# Patient Record
Sex: MALE | Race: WHITE | Employment: FULL TIME | URBAN - METROPOLITAN AREA
[De-identification: names, ages, dates, MRNs, and addresses within clinical notes are randomized per-mention and may not be internally consistent; named-entity substitution may affect disease eponyms.]

---

## 2019-02-25 ENCOUNTER — OFFICE VISIT (OUTPATIENT)
Dept: FAMILY MEDICINE | Facility: CLINIC | Age: 34
End: 2019-02-25
Payer: COMMERCIAL

## 2019-02-25 VITALS
HEIGHT: 70 IN | SYSTOLIC BLOOD PRESSURE: 128 MMHG | HEART RATE: 80 BPM | DIASTOLIC BLOOD PRESSURE: 88 MMHG | RESPIRATION RATE: 12 BRPM | WEIGHT: 256 LBS | TEMPERATURE: 98.5 F | OXYGEN SATURATION: 97 % | BODY MASS INDEX: 36.65 KG/M2

## 2019-02-25 DIAGNOSIS — Z30.09 ENCOUNTER FOR VASECTOMY COUNSELING: Primary | ICD-10-CM

## 2019-02-25 PROCEDURE — 99202 OFFICE O/P NEW SF 15 MIN: CPT | Performed by: FAMILY MEDICINE

## 2019-02-25 ASSESSMENT — MIFFLIN-ST. JEOR: SCORE: 2112.46

## 2019-02-25 ASSESSMENT — PAIN SCALES - GENERAL: PAINLEVEL: NO PAIN (0)

## 2019-02-25 NOTE — PROGRESS NOTES
"  SUBJECTIVE:   Barrington Mckinney is a 33 year old male who presents to clinic today for the following health issues:      Vasectomy consult       Barrington is here today for vasectomy consult. He is  with 2 children (boys at 6 and 2 yo).  Stated that both he and his wife are absolutely for sure that they do not want anymore children in the future.  Stated that they are very happy with what they have.  Currently they use BC pill and condom and that is going ok.   He is generally healthy. No history of scrotal or groin injury.  No history of anesthesia complication.  No other concern today.       Problem list and histories reviewed & adjusted, as indicated.  Additional history: as documented    History reviewed. No pertinent past medical history.    History reviewed. No pertinent surgical history.     No current outpatient medications on file.         No Known Allergies      ROS:  Constitutional, HEENT, cardiovascular, pulmonary, gi and gu systems are negative, except as otherwise noted.    OBJECTIVE:                                                    /66 mmHg  Pulse 68  Temp(Src) 97.6  F (36.4  C) (Temporal)  Resp 14  Ht 5' 10\" (1.778 m)  Wt 176 lb 9.6 oz (80.105 kg)  BMI 25.34 kg/m2  SpO2 99%  Body mass index is 25.34 kg/(m^2).  GENERAL: healthy, alert and no distress  RESP: lungs clear to auscultation - no rales, rhonchi or wheezes  CV: regular rate and rhythm, no murmur.   (male): normal male genitalia without lesions or urethral discharge, no hernia.  The vas deferens were easily palpated.    Diagnostic Test Results:  none      ASSESSMENT/PLAN:                                                    1. Vasectomy evaluation  I discussed with Barrington in details about different birth control options and pros and cons of each options was educated.  Reassured patient that Vasectomy is considered a permanent and irreversible birth control method therefore he should not consider it if either he or his " significant other has thought about have more children together in the future.  Patient stated that both he and his wife are very sure that they do not want to have anymore children now or in the future.  I discussed with paient about the vasectomy procedure itself as well as its pre and post procedure cares.  I also discussed about how the procedure works and risks associated with the procudure.  Reassured him that even though vasectomy is very effective, there is a very minute chance that he can still pregnant his wife.  Emphrasize the importance that he is not considered to be sterile unless he has two negative sperm tests consecutively.  Information about the procedure was also given to him and encouraged him to call in if he has any concern or question.  He feels comfortable with the plan and all of his questions were answered.  Med 178 form signed and he was encouraged to set up apt when he is ready.  He will check with his insurance to see if he needs to have the 30 day waiting period as stated in the Med 178 form and will go from there.    Total time face to face was 15 minute, more than 50% involved with counseling about Vasectomy and birth control options.      Kritsel Blanchard Mai, MD  [facility]

## 2019-03-05 ENCOUNTER — MYC MEDICAL ADVICE (OUTPATIENT)
Dept: FAMILY MEDICINE | Facility: CLINIC | Age: 34
End: 2019-03-05

## 2019-03-08 ENCOUNTER — OFFICE VISIT (OUTPATIENT)
Dept: FAMILY MEDICINE | Facility: CLINIC | Age: 34
End: 2019-03-08
Payer: COMMERCIAL

## 2019-03-08 ENCOUNTER — TELEPHONE (OUTPATIENT)
Dept: FAMILY MEDICINE | Facility: CLINIC | Age: 34
End: 2019-03-08

## 2019-03-08 VITALS
RESPIRATION RATE: 16 BRPM | SYSTOLIC BLOOD PRESSURE: 124 MMHG | WEIGHT: 253.2 LBS | HEART RATE: 82 BPM | DIASTOLIC BLOOD PRESSURE: 80 MMHG | OXYGEN SATURATION: 98 % | BODY MASS INDEX: 36.33 KG/M2 | TEMPERATURE: 97.5 F

## 2019-03-08 DIAGNOSIS — Z30.2 ENCOUNTER FOR VASECTOMY: Primary | ICD-10-CM

## 2019-03-08 DIAGNOSIS — Z30.2 ENCOUNTER FOR STERILIZATION: Primary | ICD-10-CM

## 2019-03-08 PROCEDURE — 88302 TISSUE EXAM BY PATHOLOGIST: CPT | Performed by: FAMILY MEDICINE

## 2019-03-08 PROCEDURE — 55250 REMOVAL OF SPERM DUCT(S): CPT | Performed by: FAMILY MEDICINE

## 2019-03-08 RX ORDER — LORAZEPAM 0.5 MG/1
TABLET ORAL
Qty: 3 TABLET | Refills: 0 | Status: SHIPPED | OUTPATIENT
Start: 2019-03-08

## 2019-03-08 ASSESSMENT — PAIN SCALES - GENERAL: PAINLEVEL: NO PAIN (0)

## 2019-03-08 NOTE — PROCEDURES
PROCEDURE: Vasectomy bilateral.     SURGEON:  ADELA LEE    ASSISTANT: Terry HOYOS M.A     ANESTHESIA:  6 ml of 1% Lidocaine periprostatic block bilaterally    Indications:  Voluntarily sterilization.  Both patient and his wife were desirous for permanent, irreversible birth control method and requested to have the Vasectomy done today.      Consent:  The whole procdure was again discussed with patient in details.  Associated risks and side effects were also discussed, including but not limitted to pain, infection, bleeding, hematoma and rare cases he may have prolonged pain that could last up to 6 -9 months.  Also explained to him that there is a minute chance of failed vasectomy due to recanalization.  He was reminded that he is not considered to be steriled until he had at least 2 consecutive negative sperm tests.  All of his questions were answered and the consent obtained.      Time out performed and patient was identified times two.  The name of the procedure and the site of the procedure to be done was also identified.      Procedure:  He took 2 tablets of 0.5 mg Ativan before the procedure and he tolerated them well with good effect.  The whole procedure was done in a usual sterile manner.  The scrotal area was saved with the electric shaver - no skin abrasion noted.  The scrotum was then cleaned with iodine times two.  Lidocain 1% without epi was localy injected at the median raphe, about 2 cm inferior to the base of the penis and about 2 cc was used.  The vas deferens were grasped with three fingers technique and about 3 cc total of same Lidocain was injected along the vas deferens bilaterally.  He tolerated that well and has good anesthetic effect.  A dissecting scissors was used to puncture and dissect an approximate 0.5 cm incision at the median raphe where the lidocain was injected.  The left vas deferens was palpated and grasped with the three fingers technique.  The ring clamp was used to grasp the  vas deferens through the scrotal incision and the dissecting scissors was used to dissect off all the tissues surrounding the vas deferen.  The vas was identified and the testicular end was tied up with 3.0 vicryl. The prostatic end was nicked with a #15 blade and then cauterized internally; it was then tied with 3.0 vicryl.  The vas was confirmed by cannulating of the lumen. The midportion of the vas deferens between the sutures was ligated and sent to pathology.  Cauterization was also performed to the vas deferens on both sides.  Frenstration or interposition was performed on the prostatic end of the as deferens.  The vas deferens was placed back in the scrotum .  Good hemostasis and he tolerated the procedure well.      Identical procedure was done on the right vas deferen without any difficulty.  Again, good hemostasis was noted.    There were no hematomas noted at the end of the procedure. Cautery was used sparingly for minor bleeders.     He overall tolerated the procedure well.  Post procedure care initiated and its instruction was explained and discussed in details.  Emphazsized the importance of using back up methods until he gets 2 consecutive negative sperm tests.  Tylenol or Motrin as needed for pain.  Educated him about wound care and symptoms that need to be seen or call in.  He felt comfortable with the plan and all of his questions were answered.    Kristel Caldwell MD

## 2019-03-08 NOTE — NURSING NOTE
Chief Complaint   Patient presents with     Vasectomy     Health Maintenance Due   Topic Date Due     PREVENTIVE CARE VISIT  1985     PHQ-2 Q1 YR  04/29/1997     HIV SCREEN (SYSTEM ASSIGNED)  04/29/2003     DTAP/TDAP/TD IMMUNIZATION (1 - Tdap) 04/29/2010     INFLUENZA VACCINE (1) 09/01/2018     Terry Contreras, [facility]

## 2019-03-08 NOTE — PATIENT INSTRUCTIONS
Patient Education     Having a Vasectomy: Before, During, and After the Procedure     The cut ends of the vas may be tied, closed with a clip, or sealed by heat (cauterized).   Vasectomy is an outpatient procedure. This means you can go home the same day. It can be done in a doctor s office, clinic, or hospital. Your doctor will talk with you about how to get ready for surgery. He or she will also discuss the possible risks and complications with you. After the procedure, follow your doctor s advice for recovery.  Getting ready for surgery  Your doctor will talk with you about getting ready for surgery. You may be asked to do the following:    Sign a consent form. This must be done at least a few days before surgery. It gives your doctor permission to do the procedure. It also states that a vasectomy is not guaranteed to make you sterile.    Don t take aspirin, ibuprofen, or naproxen for 2 weeks before surgery. These medicines can cause bleeding after the procedure. Also, tell your doctor if you take any medicines, supplements, or herbal remedies.    Tell your doctor if you ve had any scrotal surgery in the past.    Arrange for an adult family member or friend to give you a ride home after surgery.    Shower and clean your scrotum the day of surgery. Your doctor may also ask you to shave your scrotum.    Bring a jock strap (athletic supporter) or pair of snug cotton briefs to the doctor s office or hospital.    Eat no more than a light snack before surgery.  During surgery  The entire procedure usually lasts less than 30 minutes.    You ll be asked to undress and lie on a table.    You may be given medicine to help you relax. To prevent pain during surgery, you ll be given an injection of pain medicine in your scrotum or lower groin.    Once the area is numb, the doctor makes one or two small incisions in the scrotum. This may be done with a scalpel or with a pointed clamp (no-scalpel method).    The vas deferens  are lifted through the incision and cut. The provider seals off the ends of the vas deferens using one of several methods.    If needed, the incision is closed with stitches.    You can rest for a while until you re ready to go home.  Recovering at home  For about a week, your scrotum may look bruised and slightly swollen. You may also have a small amount of bloody discharge from the incision. This is normal.  To help make your recovery more comfortable, follow the tips below.    Stay off your feet as much as possible for the first 2 days. Try to lie flat on a bed or sofa.    Wear an athletic supporter or snug cotton briefs for support.    Reduce swelling by using an ice pack or bag of frozen peas wrapped in a thin towel. Put the ice pack or towel on your scrotum.    Take medicines with acetaminophen to relieve any discomfort. Don t use aspirin, ibuprofen, or naproxen.    Wait 48 hours before bathing.    Avoid heavy lifting or exercise for 7 days.    Ask your doctor how long to wait before having sex again. Remember: You must use another form of birth control until you re completely sterile.  When to seek medical care  Call your doctor if you notice any of the following after surgery:    Increasing pain or swelling in your scrotum    A large black-and-blue area, or a growing lump    Fever or chills    Increasing redness or drainage of the incision    Trouble urinating   Sex after vasectomy  Vasectomy doesn t change your sexual function. So when you start having sex again, it should feel the same as before. A vasectomy also shouldn t affect your relationship with your partner. It s important to remember, though, that you won t become sterile right away. It will take time before you can have sex without the need for birth control.    Until you re sterile: After a vasectomy, some active sperm still remain in your semen. It will take time and many ejaculations before the sperm are completely gone. During this period,  you must use another birth control method to prevent pregnancy. To make sure no sperm are left in your semen, you ll need to have one or more semen exams. You usually collect a semen sample at home and bring it to a lab. The sample is then checked under a microscope. You re sterile only when these samples show no evidence of sperm. Ask your doctor whether additional follow-up is needed.    After you re sterile: After your doctor tells you you re sterile, you no longer need to use any form of birth control. You re free to have sex without the fear of unwanted pregnancy. But a vasectomy does not protect you from sexually transmitted diseases (STDs). If you have more than one sex partner, be sure to practice safer sex by using condoms.  Date Last Reviewed: 1/1/2017 2000-2018 [employer]. [address] [place] [address], [place] [address]. All rights reserved. This information is not intended as a substitute for professional medical care. Always follow your healthcare professional's instructions.

## 2019-03-08 NOTE — PROGRESS NOTES
Barrington is here today with his wife for the vasectomy procedure.  Pre-procedure consultation was done last month and please see my dictation for further details. Information about vasectomy was given and he has no concern or questions about the procedure today. Again per patient, both he and his wife are ready to have this procedure done and they are for sure that they do want to have any more children in the future. He denies of URI symptoms including runny nose, nasal congestions or coughing. No abdominal pain, nausea or vomiting. No diarrhea or constipation. No problems with urination.        Problem list and histories reviewed & adjusted, as indicated.  Additional history: as documented          ROS:  Constitutional, HEENT, cardiovascular, pulmonary, gi and gu systems are negative, except as otherwise noted.      OBJECTIVE:                                                      Vitals: /80 (BP Location: Right arm, Patient Position: Sitting, Cuff Size: Adult Large)   Pulse 82   Temp 97.5  F (36.4  C) (Temporal)   Resp 16   Wt 114.9 kg (253 lb 3.2 oz)   SpO2 98%   BMI 36.33 kg/m        GENERAL: healthy, alert and no distress   (male): normal male genitalia without lesions or urethral discharge, no hernia.  Testes are descended bilaterally with no testicular mass. Scrotal skin looks normal. The vas deferens were easily by palpated bilaterally.    Diagnostic Test Results:  none         ASSESSMENT/PLAN:                                                      1. Encounter for vasectomy  Patient was again reassured that vasectomy is considered a permenant and irrevirsible birth control method.  Per patient, both he and his wife are very sure that this is what they want.  No contraindication was noted. Will perform the vasectomy and please see the procedure note for further details.  Post procedure care and wound care discussed. He was educated about symptoms to need to be seen or call in.  He was again informed  that he is not declared to be sterile until he has two negative sperm test consecutively and therefore he should continue with the current form of birth control.  Sperm tests to be conducted in 4 weeks times 2.  Tylenol or Motrin as needed for pain and Vicodin as needed for severe pain. All of his questions were answered.      ICD-10-CM    1. Encounter for sterilization Z30.2 REMOVAL OF SPERM DUCT(S) [67395]     SURGICAL PATHOLOGY EXAM     Semen Analysis Post Vasectomy       Kristel Caldwell MD  [facility]

## 2019-03-08 NOTE — PROGRESS NOTES
Ativan for vasectomy printed . Please let patient know - drop it at the pharmacy.  His apt is this morning.

## 2019-03-08 NOTE — TELEPHONE ENCOUNTER
Patient was informed that prescription was brought to the [facility].  Terry Contreras CMA

## 2019-03-13 LAB — COPATH REPORT: NORMAL

## 2019-09-11 ENCOUNTER — TELEPHONE (OUTPATIENT)
Dept: FAMILY MEDICINE | Facility: CLINIC | Age: 34
End: 2019-09-11

## 2019-09-11 NOTE — TELEPHONE ENCOUNTER
Called patient and left a voicemail to call the clinic back, when the patient calls back please let him know that he hasn't completed his post vasectomy sperm samples.  He should drop those off at the lab as soon as he gets a chance.  Thank you.     Laquita Rodriguez CMA

## 2019-11-02 ENCOUNTER — HEALTH MAINTENANCE LETTER (OUTPATIENT)
Age: 34
End: 2019-11-02

## 2020-11-16 ENCOUNTER — HEALTH MAINTENANCE LETTER (OUTPATIENT)
Age: 35
End: 2020-11-16

## 2021-09-12 ENCOUNTER — HEALTH MAINTENANCE LETTER (OUTPATIENT)
Age: 36
End: 2021-09-12

## 2022-01-02 ENCOUNTER — HEALTH MAINTENANCE LETTER (OUTPATIENT)
Age: 37
End: 2022-01-02

## 2022-11-19 ENCOUNTER — HEALTH MAINTENANCE LETTER (OUTPATIENT)
Age: 37
End: 2022-11-19

## 2023-04-09 ENCOUNTER — HEALTH MAINTENANCE LETTER (OUTPATIENT)
Age: 38
End: 2023-04-09

## 2025-04-04 ENCOUNTER — APPOINTMENT (OUTPATIENT)
Dept: ULTRASOUND IMAGING | Facility: CLINIC | Age: 40
End: 2025-04-04
Attending: EMERGENCY MEDICINE
Payer: COMMERCIAL

## 2025-04-04 ENCOUNTER — HOSPITAL ENCOUNTER (EMERGENCY)
Facility: CLINIC | Age: 40
Discharge: HOME | End: 2025-04-04
Attending: EMERGENCY MEDICINE | Admitting: EMERGENCY MEDICINE
Payer: COMMERCIAL

## 2025-04-04 VITALS
OXYGEN SATURATION: 99 % | BODY MASS INDEX: 31.92 KG/M2 | SYSTOLIC BLOOD PRESSURE: 144 MMHG | WEIGHT: 223 LBS | HEIGHT: 70 IN | TEMPERATURE: 97.5 F | DIASTOLIC BLOOD PRESSURE: 94 MMHG | HEART RATE: 72 BPM | RESPIRATION RATE: 18 BRPM

## 2025-04-04 DIAGNOSIS — I80.02 THROMBOPHLEBITIS OF SUPERFICIAL VEINS OF LEFT LOWER EXTREMITY: ICD-10-CM

## 2025-04-04 PROCEDURE — 99284 EMERGENCY DEPT VISIT MOD MDM: CPT | Performed by: EMERGENCY MEDICINE

## 2025-04-04 PROCEDURE — 93971 EXTREMITY STUDY: CPT | Mod: LT

## 2025-04-04 PROCEDURE — 99284 EMERGENCY DEPT VISIT MOD MDM: CPT | Mod: 25 | Performed by: EMERGENCY MEDICINE

## 2025-04-04 ASSESSMENT — ACTIVITIES OF DAILY LIVING (ADL)
ADLS_ACUITY_SCORE: 41

## 2025-04-04 ASSESSMENT — COLUMBIA-SUICIDE SEVERITY RATING SCALE - C-SSRS
1. IN THE PAST MONTH, HAVE YOU WISHED YOU WERE DEAD OR WISHED YOU COULD GO TO SLEEP AND NOT WAKE UP?: NO
2. HAVE YOU ACTUALLY HAD ANY THOUGHTS OF KILLING YOURSELF IN THE PAST MONTH?: NO
6. HAVE YOU EVER DONE ANYTHING, STARTED TO DO ANYTHING, OR PREPARED TO DO ANYTHING TO END YOUR LIFE?: NO

## 2025-04-04 NOTE — DISCHARGE INSTRUCTIONS
Rest, ice, elevate leg to decrease pain and inflammation.    Ibuprofen or Aleve to decrease pain and inflammation.  Take with food or milk.    You can use topical diclofenac/Voltaren ointment over the area of pain up to 4 times daily.  Alternatively you could also try lidocaine patches over the area of pain.    If you can tolerate the compression stockings, okay to use them also.    I recommend taking a baby aspirin up to twice daily.  Follow-up with the vein clinic as scheduled.    I am sending a referral for primary care.    Return at anytime for worsening, changes or concerns.    I hope you heal quickly!!

## 2025-04-04 NOTE — ED TRIAGE NOTES
Patient reports left calf redness and tenderness for about 3 days, was sent from urgent care.

## 2025-04-05 NOTE — ED PROVIDER NOTES
"  History     Chief Complaint   Patient presents with    Leg Swelling     HPI  History per patient, medical records    This is a 39-year-old male, otherwise healthy, presenting with concerns for left leg swelling.  Patient was sent by urgent care.  He has a known varicose vein on his left lower leg but for the last 3 days he has had increasing pain, tenderness to touch, redness around the area.  He denies trauma.  No fevers or chills.  No chest pain or shortness of breath.  No history of blood clots.  He believes his mom may have a clotting disorder and had history of PE with childbirth.  No recent surgeries or prolonged immobilization.    Allergies:  No Known Allergies    Problem List:    There are no active problems to display for this patient.       Past Medical History:    Past Medical History:   Diagnosis Date    NO ACTIVE PROBLEMS        Past Surgical History:    Past Surgical History:   Procedure Laterality Date    SHOULDER SURGERY         Family History:    Family History   Problem Relation Age of Onset    No Known Problems Mother     Hypertension Father     Coronary Artery Disease Maternal Grandmother     Cerebrovascular Disease Maternal Grandmother     Coronary Artery Disease Maternal Grandfather     Coronary Artery Disease Paternal Grandfather     Hypertension Sister     No Known Problems Son     No Known Problems Son        Social History:  Marital Status:   [2]  Social History     Tobacco Use    Smoking status: Never    Smokeless tobacco: Never   Substance Use Topics    Alcohol use: Yes     Comment: socially    Drug use: No        Medications:    LORazepam (ATIVAN) 0.5 MG tablet          Review of Systems  All other ROS reviewed and are negative or non-contributory except as stated in HPI.     Physical Exam   BP: (!) 144/94  Pulse: 72  Temp: 97.5  F (36.4  C)  Resp: 18  Height: 177.8 cm (5' 10\")  Weight: 101.2 kg (223 lb)  SpO2: 99 %      Physical Exam  Vitals and nursing note reviewed. "   Constitutional:       Appearance: Normal appearance.      Comments: Pleasant, healthy-appearing gentleman   HENT:      Head: Normocephalic.   Eyes:      Extraocular Movements: Extraocular movements intact.   Cardiovascular:      Rate and Rhythm: Normal rate.   Pulmonary:      Effort: Pulmonary effort is normal.   Musculoskeletal:      Cervical back: Normal range of motion.        Legs:    Skin:     General: Skin is warm and dry.   Neurological:      General: No focal deficit present.      Mental Status: He is alert.   Psychiatric:         Mood and Affect: Mood normal.         ED Course (with Medical Decision Making)    Pt seen and examined by me.  RN and EPIC notes reviewed.       Patient with left lower leg swelling and pain.  He has known superficial varicosity.  Ultrasound was ordered.    Ultrasound shows no DVT.  He has an occlusive superficial thrombophlebitis of a calf varicose vein.    Results discussed with the patient and his wife.  Recommend rest, ice, elevation.  He has compression stockings at home and he can try these if possible.  Can use over-the-counter Tylenol, ibuprofen or Aleve.  Okay to use topical Voltaren.  He has an appointment to see a vein specialist next week.  Okay to take baby aspirin 1-2 times daily.  Follow-up with primary care provider, referral sent.  Recommend considering workup for familial clotting disorder if he has true family history.  Return for any worsening, changes or concerns.        Procedures  Results for orders placed or performed during the hospital encounter of 04/04/25 (from the past 24 hours)   US Lower Extremity Venous Duplex Left    Narrative    EXAM: US LOWER EXTREMITY VENOUS DUPLEX LEFT  LOCATION: [facility]  DATE: 4/4/2025    INDICATION: Pain, varicosity, swelling  COMPARISON: None.  TECHNIQUE: Venous Duplex ultrasound of the left lower extremity with and without compression, augmentation and duplex. Color flow and spectral  Doppler with waveform analysis performed.    FINDINGS: Exam includes the common femoral, femoral, popliteal, and contralateral common femoral veins as well as segmentally visualized deep calf veins and greater saphenous vein.     LEFT: No deep vein thrombosis. Superficial thrombophlebitis involving a noncompressible proximal to mid calf varicosity. No popliteal cyst.        Impression    IMPRESSION:  1.  No deep venous thrombosis in the left lower extremity.  2.  Occlusive superficial thrombophlebitis involving a calf varicose vein.       Medications - No data to display    Assessments & Plan   I have reviewed the findings, diagnosis, plan and need for follow up with the patient.    Discharge Medication List as of 4/4/2025 10:54 AM          Final diagnoses:   Thrombophlebitis of superficial veins of left lower extremity     Disposition: Patient discharged home in stable condition.  Plan as above.  Return for concerns.     Note: Chart documentation done in part with Dragon Voice Recognition software. Although reviewed after completion, some word and grammatical errors may remain.   4/4/2025   [facility] EMERGENCY DEPT       Claudia Daniels MD  04/05/25 0159

## 2025-04-10 ENCOUNTER — TELEPHONE (OUTPATIENT)
Dept: VASCULAR SURGERY | Facility: CLINIC | Age: 40
End: 2025-04-10

## 2025-04-10 ENCOUNTER — TELEPHONE (OUTPATIENT)
Dept: OTHER | Facility: CLINIC | Age: 40
End: 2025-04-10

## 2025-04-10 ENCOUNTER — OFFICE VISIT (OUTPATIENT)
Dept: FAMILY MEDICINE | Facility: CLINIC | Age: 40
End: 2025-04-10
Attending: EMERGENCY MEDICINE
Payer: COMMERCIAL

## 2025-04-10 VITALS
TEMPERATURE: 98.1 F | SYSTOLIC BLOOD PRESSURE: 132 MMHG | RESPIRATION RATE: 16 BRPM | OXYGEN SATURATION: 97 % | WEIGHT: 224 LBS | HEIGHT: 69 IN | HEART RATE: 68 BPM | BODY MASS INDEX: 33.18 KG/M2 | DIASTOLIC BLOOD PRESSURE: 80 MMHG

## 2025-04-10 DIAGNOSIS — Z83.2 FAMILY HISTORY OF FACTOR V LEIDEN MUTATION: ICD-10-CM

## 2025-04-10 DIAGNOSIS — I80.02 THROMBOPHLEBITIS OF SUPERFICIAL VEINS OF LEFT LOWER EXTREMITY: Primary | ICD-10-CM

## 2025-04-10 ASSESSMENT — PAIN SCALES - GENERAL: PAINLEVEL_OUTOF10: NO PAIN (0)

## 2025-04-10 NOTE — LETTER
4/10/2025    Barrington Mckinney   1985        To Whom it May Concern;    Barrington Mckinney was in my office today for evaluation. Please allow him to return to work with no restrictions.     Sincerely,        Krysta Osman PA-C

## 2025-04-10 NOTE — PATIENT INSTRUCTIONS
ASSESSMENT & PLAN    Superficial Thrombophlebitis of the Left Lower Extremity:  - Occlusive superficial thrombophlebitis of the calf varicose vein confirmed by ultrasound. No deep vein thrombosis (DVT) detected. Potential familial clotting disorder due to family history.  - Continue using compression stockings, take aspirin 1 to 2 times daily, and apply heat over the affected area. Follow up with a vein specialist in 3 weeks. Referral to vascular medicine within [place] provided. Blood work ordered to test for Factor V Leiden. If positive, initiate blood thinner therapy. Encourage walking as tolerated and avoid inactivity. Provide a note for return to work without restrictions.  - Risks and side effects: Discussed potential interaction of alcohol with blood thinners and advised caution.      Blood work today    Follow up with vascular medicine    Continue with compression socks    Elevate legs in the evening    Heat

## 2025-04-10 NOTE — TELEPHONE ENCOUNTER
Patient is scheduled for his Consult Appointment with Dr Ham on Monday 04/14/25 @ 4:10pm. Pt was asked to come @3:40pm and was advised that we were working him in so it might not be until 4:10 that he is seen.

## 2025-04-10 NOTE — TELEPHONE ENCOUNTER
Referral received via [place] [facility] on 04/10/25.    Referred by Krysta Osman PA-C for occlusive superficial thrombophlebitis of LLE.      Previous imaging completed (pertinent to referral):  See EPIC    Routing to scheduling to coordinate the following:  NEW VASCULAR PATIENT consult with Dr. Ham  Please schedule this  at Monday, April at 4:10 PM - please ask patient to come 30 minutes early if able        Appt note:  Referred by Krysta Osman PA-C for occlusive superficial thrombophlebitis of LLE.

## 2025-04-14 ENCOUNTER — OFFICE VISIT (OUTPATIENT)
Dept: OTHER | Facility: CLINIC | Age: 40
End: 2025-04-14
Attending: INTERNAL MEDICINE
Payer: COMMERCIAL

## 2025-04-14 VITALS
WEIGHT: 230 LBS | SYSTOLIC BLOOD PRESSURE: 128 MMHG | DIASTOLIC BLOOD PRESSURE: 85 MMHG | OXYGEN SATURATION: 98 % | BODY MASS INDEX: 33.97 KG/M2 | HEART RATE: 65 BPM

## 2025-04-14 DIAGNOSIS — I80.02 THROMBOPHLEBITIS OF SUPERFICIAL VEINS OF LEFT LOWER EXTREMITY: ICD-10-CM

## 2025-04-14 PROCEDURE — 3079F DIAST BP 80-89 MM HG: CPT | Performed by: INTERNAL MEDICINE

## 2025-04-14 PROCEDURE — 3074F SYST BP LT 130 MM HG: CPT | Performed by: INTERNAL MEDICINE

## 2025-04-14 PROCEDURE — 99213 OFFICE O/P EST LOW 20 MIN: CPT | Performed by: INTERNAL MEDICINE

## 2025-04-14 PROCEDURE — G2211 COMPLEX E/M VISIT ADD ON: HCPCS | Performed by: INTERNAL MEDICINE

## 2025-04-14 PROCEDURE — 99205 OFFICE O/P NEW HI 60 MIN: CPT | Performed by: INTERNAL MEDICINE

## 2025-04-14 RX ORDER — ASPIRIN 81 MG/1
81 TABLET ORAL 2 TIMES DAILY
COMMUNITY
End: 2025-04-14

## 2025-04-14 NOTE — PROGRESS NOTES
INITIAL VASCULAR MEDICAL ASSESSMENT  REFERRAL SOURCE: Krysta Osman PA-C  REASON FOR CONSULT:  for occlusive superficial   thrombophlebitis of LLE.             A/P:      (I80.02) Thrombophlebitis of superficial veins of left lower extremity  Comment: First lifetime event. Wear knee high 20-30 ,mm Hg compressive hosiery. Start AC as below. Check d dimer as baseline. Repeat d dimer in three months. Check venous comp study as well in three months, RTC one week later for f/u of labs and imaging and to decide upon AC cessation. Leiden mutation status does not affect decision to AC as this is being made on clinical grounds. If not resorbed and a homozygote however, other discussion will need to occur with the patient.   Plan: rivaroxaban ANTICOAGULANT (XARELTO) 15 MG TABS         tablet, rivaroxaban ANTICOAGULANT (XARELTO) 20         MG TABS tablet, D dimer quantitative, D dimer         quantitative, US Venous Competency Bilateral              The longitudinal care of plan for Barrington was addressed during this visit. Due to added complexity of care, we will continue to support Barrington Mckinney  and the subsequent management of these conditions and with ongoing continuity of care for these conditions.       70 minutes total medical care on today's date.      HPI: Barrington Mckinney is a 39 year old male with a h/o a recently discovered LLE calf varicose vein painful superficial thrombophlebitis. His 4/4/25 venous duplex revealed an  occlusive superficial thrombophlebitis involving a calf varicose vein. With this he notes pain. This is his first lifetime VTE. His mother has had multiple DVTs. The patient presents today to address pain management of the above. 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 He stands for prolonged periods.     Review Of Systems  Skin: as above  Eyes: negative  Ears/Nose/Throat: negative  Respiratory: No shortness of breath, dyspnea on exertion, cough, or hemoptysis  Cardiovascular:  negative  Gastrointestinal: negative  Genitourinary: negative  Musculoskeletal: as above  Neurologic: negative  Psychiatric: negative  Hematologic/Lymphatic/Immunologic: negative  Endocrine: negative      PAST MEDICAL HISTORY:                  Past Medical History:   Diagnosis Date    NO ACTIVE PROBLEMS        PAST SURGICAL HISTORY:                  Past Surgical History:   Procedure Laterality Date    SHOULDER SURGERY         CURRENT MEDICATIONS:                  Current Outpatient Medications   Medication Sig Dispense Refill    LORazepam (ATIVAN) 0.5 MG tablet Take 1-2 tablets 30 minutes before procedure as needed as instructed.  Do not operate any type of machine while taking this medication 3 tablet 0       ALLERGIES:                No Known Allergies    SOCIAL HISTORY:                  Social History     Socioeconomic History    Marital status:      Spouse name: Not on file    Number of children: Not on file    Years of education: Not on file    Highest education level: Not on file   Occupational History    Not on file   Tobacco Use    Smoking status: Never    Smokeless tobacco: Never   Vaping Use    Vaping status: Never Used   Substance and Sexual Activity    Alcohol use: Yes     Comment: socially    Drug use: No    Sexual activity: Yes     Partners: Female     Birth control/protection: Pill, Male Surgical   Other Topics Concern    Parent/sibling w/ CABG, MI or angioplasty before 65F 55M? No   Social History Narrative    Not on file     Social Drivers of Health     Financial Resource Strain: Low Risk  (4/9/2025)    Financial Resource Strain     Within the past 12 months, have you or your family members you live with been unable to get utilities (heat, electricity) when it was really needed?: No   Food Insecurity: Low Risk  (4/9/2025)    Food Insecurity     Within the past 12 months, did you worry that your food would run out before you got money to buy more?: No     Within the past 12 months, did the  food you bought just not last and you didn t have money to get more?: No   Transportation Needs: Low Risk  (4/9/2025)    Transportation Needs     Within the past 12 months, has lack of transportation kept you from medical appointments, getting your medicines, non-medical meetings or appointments, work, or from getting things that you need?: No   Physical Activity: Not on file   Stress: Not on file   Social Connections: Unknown (3/3/2022)    [facility] from [facility]    Social Connections     Frequency of Communication with Friends and Family: Not on file   Interpersonal Safety: Low Risk  (4/10/2025)    Interpersonal Safety     Do you feel physically and emotionally safe where you currently live?: Yes     Within the past 12 months, have you been hit, slapped, kicked or otherwise physically hurt by someone?: No     Within the past 12 months, have you been humiliated or emotionally abused in other ways by your partner or ex-partner?: No   Housing Stability: Low Risk  (4/9/2025)    Housing Stability     Do you have housing? : Yes     Are you worried about losing your housing?: No       FAMILY HISTORY:                   Family History   Problem Relation Age of Onset    No Known Problems Mother     Hypertension Father     Coronary Artery Disease Maternal Grandmother     Cerebrovascular Disease Maternal Grandmother     Coronary Artery Disease Maternal Grandfather     Coronary Artery Disease Paternal Grandfather     Hypertension Sister     No Known Problems Son     No Known Problems Son          Physical exam Reveals:    O/P: WNL  HEENT: WNL  NECK: No JVD, thyromegaly, or lymphadenopathy  HEART: RRR, no murmurs, gallops, or rubs  LUNGS: CTA bilaterally without rales, wheezes, or rhonchi  GI: NABS, nondistended, nontender, soft  EXT:without cyanosis, clubbing; :LLE sightly enlarged compared to right. Thrombosed calf varicosity noted.   NEURO: nonfocal  : no flank tenderness           All  relevant labs and imaging reviewed by myself on today's date.

## 2025-04-14 NOTE — PROGRESS NOTES
[facility]        Patient is here for a consult.    Pt is currently taking Aspirin.    /85 (BP Location: Left arm, Patient Position: Chair, Cuff Size: Adult Regular)   Pulse 65   Wt 230 lb (104.3 kg)   SpO2 98%   BMI 33.97 kg/m      The provider has been notified that the patient has no concerns.     Questions patient would like addressed today are: N/A.    Refills are needed: N/A    Has homecare services and agency name:  Kadie Cool MA

## 2025-04-15 ENCOUNTER — TELEPHONE (OUTPATIENT)
Dept: ANTICOAGULATION | Facility: CLINIC | Age: 40
End: 2025-04-15
Payer: COMMERCIAL

## 2025-04-15 NOTE — TELEPHONE ENCOUNTER
Writer spoke to pharmacist to verify which anticoagulant patient picked up. Pharmacist reports patient picked up xarelto on 4/14/25 (prescribed by Dr. Ham) utilizing a coupon/co-pay card. Per chart review, eliquis starter pack and ongoing pack was prescribed by PCP's office today, unclear why.    Writer left message for patient requesting a call back to clarify which blood thinner patient plans to continue with/take.    Sakshi Shaw RN  Anticoagulation Clinic

## 2025-04-16 ENCOUNTER — LAB (OUTPATIENT)
Dept: LAB | Facility: CLINIC | Age: 40
End: 2025-04-16
Payer: COMMERCIAL

## 2025-04-16 DIAGNOSIS — I80.02 THROMBOPHLEBITIS OF SUPERFICIAL VEINS OF LEFT LOWER EXTREMITY: ICD-10-CM

## 2025-04-16 LAB — D DIMER PPP FEU-MCNC: 0.4 UG/ML FEU (ref 0–0.5)

## 2025-04-16 PROCEDURE — 85379 FIBRIN DEGRADATION QUANT: CPT

## 2025-04-16 PROCEDURE — 36415 COLL VENOUS BLD VENIPUNCTURE: CPT

## 2025-04-17 NOTE — TELEPHONE ENCOUNTER
Writer spoke to spouse Brook, she reports that Barrington is taking only Xarelto and after visiting with Dr. Ham, Barrington will continue to take Xarelto.  Barrington plans to follow up with Dr. Ham again for follow up and possible lab work.      [facility] updated and closed.    Sakshi Shaw RN  Anticoagulation Clinic

## 2025-04-19 ENCOUNTER — HEALTH MAINTENANCE LETTER (OUTPATIENT)
Age: 40
End: 2025-04-19

## 2025-04-22 ENCOUNTER — VIRTUAL VISIT (OUTPATIENT)
Dept: OTHER | Facility: CLINIC | Age: 40
End: 2025-04-22
Attending: INTERNAL MEDICINE
Payer: COMMERCIAL

## 2025-04-22 DIAGNOSIS — I80.02 THROMBOPHLEBITIS OF SUPERFICIAL VEINS OF LEFT LOWER EXTREMITY: Primary | ICD-10-CM

## 2025-04-22 PROCEDURE — 98005 SYNCH AUDIO-VIDEO EST LOW 20: CPT | Performed by: INTERNAL MEDICINE

## 2025-04-22 NOTE — PROGRESS NOTES
VASCULAR MEDICAL ASSESSMENT  REFERRAL SOURCE: Krysta Osman PA-C  REASON FOR CONSULT:  for occlusive superficial   thrombophlebitis of LLE.                 A/P:        (I80.02) Thrombophlebitis of superficial veins of left lower extremity  Comment: First lifetime event. Wear knee high 20-30 ,mm Hg compressive hosiery. Start AC as below. D dimer as baseline was normal at 0.40 on 4/16/25. This implies a chronicity to the current event. . Repeat d dimer in three months. Check venous comp study as well in three months, RTC one week later for f/u of labs and imaging and to decide upon AC cessation. Leiden mutation status does not affect decision to AC as this is being made on clinical grounds. It was nonetheless checked by his PCP and the  buzz is a heterozygote.  Plan:Continue Xarelto, check D dimer         quantitative, US Venous Competency Bilateral in 07/2025. Wear compression hosiery.                 The longitudinal care of plan for Barrington was addressed during this visit. Due to added complexity of care, we will continue to support Barrington Mckinney  and the subsequent management of these conditions and with ongoing continuity of care for these conditions.       22 minutes total medical care on today's date.Time counter in Epic is < than this as I was not logged into his chart for the entire time of the visit.    MD location: office  Pt location: home    Phone call start: 16:46  Phone call end: 17:08     Doximity     HPI: Barrington Mckinney is a 39 year old male with a h/o a recently discovered LLE calf varicose vein painful superficial thrombophlebitis. His 4/4/25 venous duplex revealed an  occlusive superficial thrombophlebitis involving a calf varicose vein. With this he notes pain. This is his first lifetime VTE. His mother has had multiple DVTs. The patient presents today to address pain management of the above. 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 He stands for prolonged periods.

## 2025-04-22 NOTE — PROGRESS NOTES
Virtual Visit Details    Type of service:  Video Visit     Originating Location (pt. Location): Home    Distant Location (provider location):  On-site  Platform used for Video Visit: John

## 2025-05-20 ENCOUNTER — DOCUMENTATION ONLY (OUTPATIENT)
Dept: ANTICOAGULATION | Facility: CLINIC | Age: 40
End: 2025-05-20
Payer: COMMERCIAL

## 2025-05-20 NOTE — PROGRESS NOTES
Anticoagulant Therapeutic Duplication    Duplicate orders identified: same medication but different dose, form, frequency or route    The duplicate anticoagulant order(s) has been discontinued    Active anticoagulant: rivaroxaban (Xarelto)    Plan made per [facility] anticoagulation protocol.    Edison Danielson RN  5/20/2025